# Patient Record
Sex: MALE | Race: WHITE | NOT HISPANIC OR LATINO | Employment: OTHER | URBAN - METROPOLITAN AREA
[De-identification: names, ages, dates, MRNs, and addresses within clinical notes are randomized per-mention and may not be internally consistent; named-entity substitution may affect disease eponyms.]

---

## 2017-07-17 ENCOUNTER — TRANSFERRED RECORDS (OUTPATIENT)
Dept: HEALTH INFORMATION MANAGEMENT | Facility: CLINIC | Age: 67
End: 2017-07-17

## 2017-10-12 ENCOUNTER — COMMUNICATION - HEALTHEAST (OUTPATIENT)
Dept: ADMINISTRATIVE | Facility: CLINIC | Age: 67
End: 2017-10-12

## 2017-10-29 ENCOUNTER — TRANSFERRED RECORDS (OUTPATIENT)
Dept: HEALTH INFORMATION MANAGEMENT | Facility: CLINIC | Age: 67
End: 2017-10-29

## 2017-10-29 ENCOUNTER — MEDICAL CORRESPONDENCE (OUTPATIENT)
Dept: HEALTH INFORMATION MANAGEMENT | Facility: CLINIC | Age: 67
End: 2017-10-29

## 2017-11-14 ENCOUNTER — RECORDS - HEALTHEAST (OUTPATIENT)
Dept: ADMINISTRATIVE | Facility: OTHER | Age: 67
End: 2017-11-14

## 2017-11-14 ENCOUNTER — AMBULATORY - HEALTHEAST (OUTPATIENT)
Dept: CARDIOLOGY | Facility: CLINIC | Age: 67
End: 2017-11-14

## 2017-11-16 ENCOUNTER — HOSPITAL ENCOUNTER (OUTPATIENT)
Dept: ULTRASOUND IMAGING | Facility: CLINIC | Age: 67
Discharge: HOME | End: 2017-11-16
Attending: INTERNAL MEDICINE

## 2017-11-16 ENCOUNTER — TRANSFERRED RECORDS (OUTPATIENT)
Dept: HEALTH INFORMATION MANAGEMENT | Facility: CLINIC | Age: 67
End: 2017-11-16

## 2017-11-16 ENCOUNTER — OFFICE VISIT - HEALTHEAST (OUTPATIENT)
Dept: CARDIOLOGY | Facility: CLINIC | Age: 67
End: 2017-11-16

## 2017-11-16 DIAGNOSIS — E78.00 HYPERCHOLESTEREMIA: ICD-10-CM

## 2017-11-16 DIAGNOSIS — I70.90 ATHEROSCLEROSIS: ICD-10-CM

## 2017-11-16 DIAGNOSIS — I25.10 ATHEROSCLEROSIS OF NATIVE CORONARY ARTERY OF NATIVE HEART WITHOUT ANGINA PECTORIS: ICD-10-CM

## 2017-11-16 DIAGNOSIS — R42 DIZZINESS: ICD-10-CM

## 2017-11-16 DIAGNOSIS — I10 ESSENTIAL HYPERTENSION: ICD-10-CM

## 2017-11-16 DIAGNOSIS — R09.89 OTHER SPECIFIED SYMPTOMS AND SIGNS INVOLVING THE CIRCULATORY AND RESPIRATORY SYSTEMS: ICD-10-CM

## 2017-11-16 RX ORDER — ALLOPURINOL 300 MG/1
300 TABLET ORAL DAILY
Status: SHIPPED | COMMUNITY
Start: 2016-12-07

## 2017-11-16 ASSESSMENT — MIFFLIN-ST. JEOR: SCORE: 1633.5

## 2017-11-27 ENCOUNTER — HOSPITAL ENCOUNTER (OUTPATIENT)
Dept: CARDIOLOGY | Facility: CLINIC | Age: 67
Discharge: HOME | End: 2017-11-27
Attending: INTERNAL MEDICINE

## 2017-11-27 DIAGNOSIS — I25.10 ATHEROSCLEROSIS OF NATIVE CORONARY ARTERY OF NATIVE HEART WITHOUT ANGINA PECTORIS: ICD-10-CM

## 2017-11-27 LAB
CV STRESS CURRENT BP HE: NORMAL
CV STRESS CURRENT HR HE: 101
CV STRESS CURRENT HR HE: 104
CV STRESS CURRENT HR HE: 105
CV STRESS CURRENT HR HE: 106
CV STRESS CURRENT HR HE: 111
CV STRESS CURRENT HR HE: 112
CV STRESS CURRENT HR HE: 116
CV STRESS CURRENT HR HE: 117
CV STRESS CURRENT HR HE: 117
CV STRESS CURRENT HR HE: 121
CV STRESS CURRENT HR HE: 122
CV STRESS CURRENT HR HE: 123
CV STRESS CURRENT HR HE: 127
CV STRESS CURRENT HR HE: 128
CV STRESS CURRENT HR HE: 136
CV STRESS CURRENT HR HE: 138
CV STRESS CURRENT HR HE: 138
CV STRESS CURRENT HR HE: 79
CV STRESS CURRENT HR HE: 83
CV STRESS CURRENT HR HE: 90
CV STRESS CURRENT HR HE: 92
CV STRESS CURRENT HR HE: 93
CV STRESS CURRENT HR HE: 93
CV STRESS CURRENT HR HE: 98
CV STRESS CURRENT HR HE: 99
CV STRESS DEVIATION TIME HE: NORMAL
CV STRESS ECHO PERCENT HR HE: NORMAL
CV STRESS EXERCISE STAGE HE: NORMAL
CV STRESS FINAL RESTING BP HE: NORMAL
CV STRESS FINAL RESTING HR HE: 99
CV STRESS MAX HR HE: 140
CV STRESS MAX TREADMILL GRADE HE: 16
CV STRESS MAX TREADMILL SPEED HE: 4.2
CV STRESS PEAK DIA BP HE: NORMAL
CV STRESS PEAK SYS BP HE: NORMAL
CV STRESS PHASE HE: NORMAL
CV STRESS PROTOCOL HE: NORMAL
CV STRESS RESTING PT POSITION HE: NORMAL
CV STRESS RESTING PT POSITION HE: NORMAL
CV STRESS ST DEVIATION AMOUNT HE: NORMAL
CV STRESS ST DEVIATION ELEVATION HE: NORMAL
CV STRESS ST EVELATION AMOUNT HE: NORMAL
CV STRESS TEST TYPE HE: NORMAL
CV STRESS TOTAL STAGE TIME MIN 1 HE: NORMAL
STRESS ECHO BASELINE BP: NORMAL
STRESS ECHO BASELINE HR: 81
STRESS ECHO CALCULATED PERCENT HR: 93 %
STRESS ECHO LAST STRESS BP: NORMAL
STRESS ECHO LAST STRESS HR: 138
STRESS ECHO POST ESTIMATED WORKLOAD: 11.9
STRESS ECHO POST EXERCISE DUR MIN: 10
STRESS ECHO POST EXERCISE DUR SEC: 10
STRESS ECHO TARGET HR: 128

## 2017-12-13 ENCOUNTER — TRANSFERRED RECORDS (OUTPATIENT)
Dept: HEALTH INFORMATION MANAGEMENT | Facility: CLINIC | Age: 67
End: 2017-12-13

## 2018-10-26 ENCOUNTER — TRANSFERRED RECORDS (OUTPATIENT)
Dept: HEALTH INFORMATION MANAGEMENT | Facility: CLINIC | Age: 68
End: 2018-10-26

## 2018-12-10 ENCOUNTER — TRANSFERRED RECORDS (OUTPATIENT)
Dept: HEALTH INFORMATION MANAGEMENT | Facility: CLINIC | Age: 68
End: 2018-12-10

## 2019-10-21 ENCOUNTER — AMBULATORY - HEALTHEAST (OUTPATIENT)
Dept: CARDIOLOGY | Facility: CLINIC | Age: 69
End: 2019-10-21

## 2019-10-21 ENCOUNTER — RECORDS - HEALTHEAST (OUTPATIENT)
Dept: ADMINISTRATIVE | Facility: OTHER | Age: 69
End: 2019-10-21

## 2019-10-28 ENCOUNTER — OFFICE VISIT - HEALTHEAST (OUTPATIENT)
Dept: CARDIOLOGY | Facility: CLINIC | Age: 69
End: 2019-10-28

## 2019-10-28 DIAGNOSIS — I25.119 ATHEROSCLEROSIS OF NATIVE CORONARY ARTERY OF NATIVE HEART WITH ANGINA PECTORIS (H): ICD-10-CM

## 2019-10-28 DIAGNOSIS — E78.00 HYPERCHOLESTEREMIA: ICD-10-CM

## 2019-10-28 DIAGNOSIS — I10 ESSENTIAL HYPERTENSION: ICD-10-CM

## 2019-10-28 DIAGNOSIS — I25.118 CORONARY ARTERY DISEASE OF NATIVE ARTERY OF NATIVE HEART WITH STABLE ANGINA PECTORIS (H): ICD-10-CM

## 2019-11-07 ENCOUNTER — HOSPITAL ENCOUNTER (OUTPATIENT)
Dept: CARDIOLOGY | Facility: CLINIC | Age: 69
Discharge: HOME | End: 2019-11-07
Attending: INTERNAL MEDICINE

## 2019-11-07 DIAGNOSIS — I25.118 CORONARY ARTERY DISEASE OF NATIVE ARTERY OF NATIVE HEART WITH STABLE ANGINA PECTORIS (H): ICD-10-CM

## 2019-11-07 LAB
CV STRESS CURRENT BP HE: NORMAL
CV STRESS CURRENT HR HE: 101
CV STRESS CURRENT HR HE: 106
CV STRESS CURRENT HR HE: 106
CV STRESS CURRENT HR HE: 109
CV STRESS CURRENT HR HE: 112
CV STRESS CURRENT HR HE: 118
CV STRESS CURRENT HR HE: 121
CV STRESS CURRENT HR HE: 127
CV STRESS CURRENT HR HE: 127
CV STRESS CURRENT HR HE: 128
CV STRESS CURRENT HR HE: 128
CV STRESS CURRENT HR HE: 69
CV STRESS CURRENT HR HE: 70
CV STRESS CURRENT HR HE: 77
CV STRESS CURRENT HR HE: 80
CV STRESS CURRENT HR HE: 81
CV STRESS CURRENT HR HE: 81
CV STRESS CURRENT HR HE: 83
CV STRESS CURRENT HR HE: 84
CV STRESS CURRENT HR HE: 85
CV STRESS CURRENT HR HE: 85
CV STRESS CURRENT HR HE: 88
CV STRESS CURRENT HR HE: 91
CV STRESS CURRENT HR HE: 92
CV STRESS CURRENT HR HE: 93
CV STRESS CURRENT HR HE: 95
CV STRESS CURRENT HR HE: 95
CV STRESS CURRENT HR HE: 99
CV STRESS DEVIATION TIME HE: NORMAL
CV STRESS ECHO PERCENT HR HE: NORMAL
CV STRESS EXERCISE STAGE HE: NORMAL
CV STRESS FINAL RESTING BP HE: NORMAL
CV STRESS FINAL RESTING HR HE: 81
CV STRESS MAX HR HE: 131
CV STRESS MAX TREADMILL GRADE HE: 16
CV STRESS MAX TREADMILL SPEED HE: 4.2
CV STRESS PEAK DIA BP HE: NORMAL
CV STRESS PEAK SYS BP HE: NORMAL
CV STRESS PHASE HE: NORMAL
CV STRESS PROTOCOL HE: NORMAL
CV STRESS RESTING PT POSITION HE: NORMAL
CV STRESS RESTING PT POSITION HE: NORMAL
CV STRESS ST DEVIATION AMOUNT HE: NORMAL
CV STRESS ST DEVIATION ELEVATION HE: NORMAL
CV STRESS ST EVELATION AMOUNT HE: NORMAL
CV STRESS TEST TYPE HE: NORMAL
CV STRESS TOTAL STAGE TIME MIN 1 HE: NORMAL
RATE PRESSURE PRODUCT: NORMAL
STRESS ECHO BASELINE DIASTOLIC HE: 64
STRESS ECHO BASELINE HR: 69
STRESS ECHO BASELINE SYSTOLIC BP: 114
STRESS ECHO CALCULATED PERCENT HR: 88 %
STRESS ECHO LAST STRESS DIASTOLIC BP: 62
STRESS ECHO LAST STRESS HR: 128
STRESS ECHO LAST STRESS SYSTOLIC BP: 158
STRESS ECHO POST ESTIMATED WORKLOAD: 11.1
STRESS ECHO POST EXERCISE DUR MIN: 9
STRESS ECHO POST EXERCISE DUR SEC: 33
STRESS ECHO TARGET HR: 149

## 2020-11-05 ENCOUNTER — RECORDS - HEALTHEAST (OUTPATIENT)
Dept: ADMINISTRATIVE | Facility: OTHER | Age: 70
End: 2020-11-05

## 2020-11-05 ENCOUNTER — AMBULATORY - HEALTHEAST (OUTPATIENT)
Dept: CARDIOLOGY | Facility: CLINIC | Age: 70
End: 2020-11-05

## 2020-11-05 ENCOUNTER — OFFICE VISIT - HEALTHEAST (OUTPATIENT)
Dept: CARDIOLOGY | Facility: CLINIC | Age: 70
End: 2020-11-05

## 2020-11-05 DIAGNOSIS — I70.90 ATHEROSCLEROSIS: ICD-10-CM

## 2020-11-05 LAB
CHOLEST SERPL-MCNC: 146 MG/DL
FASTING STATUS PATIENT QL REPORTED: NO
HDLC SERPL-MCNC: 41 MG/DL

## 2020-11-06 ENCOUNTER — COMMUNICATION - HEALTHEAST (OUTPATIENT)
Dept: CARDIOLOGY | Facility: CLINIC | Age: 70
End: 2020-11-06

## 2020-12-10 ENCOUNTER — TRANSFERRED RECORDS (OUTPATIENT)
Dept: HEALTH INFORMATION MANAGEMENT | Facility: CLINIC | Age: 70
End: 2020-12-10

## 2021-05-24 ENCOUNTER — RECORDS - HEALTHEAST (OUTPATIENT)
Dept: ADMINISTRATIVE | Facility: CLINIC | Age: 71
End: 2021-05-24

## 2021-05-25 ENCOUNTER — RECORDS - HEALTHEAST (OUTPATIENT)
Dept: ADMINISTRATIVE | Facility: CLINIC | Age: 71
End: 2021-05-25

## 2021-05-30 ENCOUNTER — RECORDS - HEALTHEAST (OUTPATIENT)
Dept: ADMINISTRATIVE | Facility: CLINIC | Age: 71
End: 2021-05-30

## 2021-05-31 ENCOUNTER — RECORDS - HEALTHEAST (OUTPATIENT)
Dept: ADMINISTRATIVE | Facility: CLINIC | Age: 71
End: 2021-05-31

## 2021-05-31 VITALS — WEIGHT: 191 LBS | HEIGHT: 71 IN | BODY MASS INDEX: 26.74 KG/M2

## 2021-06-03 VITALS
DIASTOLIC BLOOD PRESSURE: 78 MMHG | SYSTOLIC BLOOD PRESSURE: 119 MMHG | RESPIRATION RATE: 16 BRPM | BODY MASS INDEX: 26.72 KG/M2 | HEART RATE: 54 BPM | WEIGHT: 191.56 LBS | OXYGEN SATURATION: 97 %

## 2021-06-05 VITALS
DIASTOLIC BLOOD PRESSURE: 70 MMHG | RESPIRATION RATE: 16 BRPM | SYSTOLIC BLOOD PRESSURE: 110 MMHG | HEART RATE: 62 BPM | OXYGEN SATURATION: 100 % | WEIGHT: 189.5 LBS | BODY MASS INDEX: 26.43 KG/M2

## 2021-06-14 NOTE — PROGRESS NOTES
[facility]   Outpatient Follow-up evaluation.      Current Outpatient Prescriptions:      allopurinol (ZYLOPRIM) 300 MG tablet, Take by mouth., Disp: , Rfl:      aspirin-calcium carbonate 81 mg-300 mg calcium(777 mg) Tab, Take 81 mg by mouth., Disp: , Rfl:      atorvastatin (LIPITOR) 40 MG tablet, Take 40 mg by mouth bedtime., Disp: , Rfl:      lisinopril-hydrochlorothiazide (PRINZIDE,ZESTORETIC) 20-25 mg per tablet, Take 1 tablet by mouth daily., Disp: 90 tablet, Rfl: 0     metoprolol succinate (TOPROL-XL) 25 MG, Take 12.5 mg by mouth daily. 1/2 tablet daily, Disp: , Rfl:      nitroglycerin (NITROSTAT) 0.4 MG SL tablet, Place 0.4 mg under the tongue every 5 (five) minutes as needed for chest pain., Disp: , Rfl:      PARoxetine (PAXIL) 10 MG tablet, Take 10 mg by mouth every morning., Disp: , Rfl:      cephalexin (KEFLEX) 500 MG capsule, Take 500 mg by mouth 3 (three) times a day., Disp: , Rfl:      oxyCODONE-acetaminophen (PERCOCET) 5-325 mg per tablet, Take 1 tablet by mouth every 4 (four) hours as needed for pain., Disp: , Rfl:      PERCOCET 5-325 mg per tablet, Take 1-2 tablets by mouth every 4 (four) hours as needed for pain., Disp: 30 tablet, Rfl: 0        Bill Humphries is a 67 y.o. Male    Chief Complaint   Patient presents with     Follow-up       Diagnoses:  See Problem list     Recommendations:    Based on his risk profile symptoms would recommend a carotid ultrasound to assess dizziness lightheadedness is a symptomatic manifestation of atherosclerotic carotid artery disease.  In view of his past history of myocardial infarction without any premonitory symptoms but again obtain a exercise stress test to screen for ischemic heart disease.  As he may have a relative silent cardiac ischemic condition.  Continue with atorvastatin management of hyperlipidemia which she is currently in control.  Blood pressure is well controlled as well.  In view of lightheadedness and dizziness would suggest  withdrawal of metoprolol therapy and reassess.  If he begins to have palpitations again which is the reason he had initiated therapy we can consider resumption.      Subjective:   No active angina at this time breathing is stable.  Exercises regularly.  Had recent URI that was somewhat prolonged which she is currently recovering.  Tolerating his medication well but is noticing more frequent episodes of lightheadedness and dizzy episodes.  When he had his acute myocardial infarction he had no previous symptoms beforehand his for symptomatic manifestation was of a tightness of the chest and occurred the day of the infarction.                    Past Medical History:   Diagnosis Date     Coronary artery disease     x3 stents     Hyperlipidemia      Hypertension      Myocardial infarction 2009     Past Surgical History:   Procedure Laterality Date     APPENDECTOMY       CARDIAC CATHETERIZATION       PENILE PROSTHESIS IMPLANT       PENILE PROSTHESIS REVISION N/A 6/30/2015    Procedure: REMOVE PENILE PROSTHESIS;  Surgeon: John Snyder MD;  Location: [facility] [place];  Service:      MO REMOVAL OF TONSILS,<11 Y/O      Description: Tonsillectomy;  Recorded: 01/03/2008;  Annotations: 1958     prostratectomy       Allergies   Allergen Reactions     Omniscan [Gadodiamide] Hives     No family history on file.   Social History     Social History     Marital status:      Spouse name: N/A     Number of children: N/A     Years of education: N/A     Occupational History     Not on file.     Social History Main Topics     Smoking status: Never Smoker     Smokeless tobacco: Not on file     Alcohol use Yes     Drug use: No     Sexual activity: Not on file     Other Topics Concern     Not on file     Social History Narrative     Family history not pertinent to chief complaint or presenting problem    Current Outpatient Prescriptions:      allopurinol (ZYLOPRIM) 300 MG tablet, Take by mouth., Disp: , Rfl:       "aspirin-calcium carbonate 81 mg-300 mg calcium(777 mg) Tab, Take 81 mg by mouth., Disp: , Rfl:      atorvastatin (LIPITOR) 40 MG tablet, Take 40 mg by mouth bedtime., Disp: , Rfl:      lisinopril-hydrochlorothiazide (PRINZIDE,ZESTORETIC) 20-25 mg per tablet, Take 1 tablet by mouth daily., Disp: 90 tablet, Rfl: 0     metoprolol succinate (TOPROL-XL) 25 MG, Take 12.5 mg by mouth daily. 1/2 tablet daily, Disp: , Rfl:      nitroglycerin (NITROSTAT) 0.4 MG SL tablet, Place 0.4 mg under the tongue every 5 (five) minutes as needed for chest pain., Disp: , Rfl:      PARoxetine (PAXIL) 10 MG tablet, Take 10 mg by mouth every morning., Disp: , Rfl:      cephalexin (KEFLEX) 500 MG capsule, Take 500 mg by mouth 3 (three) times a day., Disp: , Rfl:      oxyCODONE-acetaminophen (PERCOCET) 5-325 mg per tablet, Take 1 tablet by mouth every 4 (four) hours as needed for pain., Disp: , Rfl:      PERCOCET 5-325 mg per tablet, Take 1-2 tablets by mouth every 4 (four) hours as needed for pain., Disp: 30 tablet, Rfl: 0      Objective:   /66 (Patient Site: Left Arm, Patient Position: Sitting, Cuff Size: Adult Large)  Pulse 67  Resp 16  Ht 5' 11\" (1.803 m)  Wt 191 lb (86.6 kg) Comment: With shoes  BMI 26.64 kg/m2  191 lb (86.6 kg)   Wt Readings from Last 3 Encounters:   11/16/17 191 lb (86.6 kg)   06/30/15 185 lb (83.9 kg)     BP Readings from Last 3 Encounters:   11/16/17 102/66   06/30/15 115/62     Pulse Readings from Last 3 Encounters:   11/16/17 67   06/30/15 70     General appearance: alert, appears stated age and cooperative  Head: Normocephalic, without obvious abnormality, atraumatic  Eyes: Normal external exam without jaundice.  Ears: Normal external auricular exam.  Nose: Normal external exam.  Lungs: clear to auscultation bilaterally  Chest wall: no tenderness  Heart: regular rate and rhythm, S1, S2 normal, no murmur, click, rub or gallop normal  Pulses: 2+ and symmetric  Skin: Skin color, texture, turgor normal. "   Neurologic: Grossly normal, no focal neurologic findings.    Review of Systems:   General: Night Sweats  Cardiographics: Reviewed in clinic.    Lab Results:  Lab Results: Personally reviewed  Lab Results   Component Value Date    CHOL 165 11/07/2012    TRIG 249 (H) 11/07/2012    HDL 45 11/07/2012       Clinical evaluation time today including exam 20 minutes.  At least 50% of clinic evaluation time involved in assessment and patient counseling.  Part of this chart was created using a dictation software.  Typographic errors, word substitutions, and grammatical errors may unintentionally occur.    Errol Reyes M.D.  [facility]

## 2021-06-16 PROBLEM — R42 DIZZINESS: Status: ACTIVE | Noted: 2017-11-16

## 2021-06-16 PROBLEM — I70.90 ATHEROSCLEROSIS: Status: ACTIVE | Noted: 2017-11-16

## 2021-06-21 NOTE — LETTER
Letter by Errol Reyes MD at      Author: Errol Reyes MD Service: -- Author Type: --    Filed:  Encounter Date: 11/6/2020 Status: (Other)         Bill Humphries  [address] [place] [address]             November 6, 2020         Dear Mr. Humphries,    Below are the results from your recent visit:    Resulted Orders   HDL cholesterol   Result Value Ref Range    HDL Cholesterol 41 >=40 mg/dL    Patient Fasting > 8hrs? No    Cholesterol, total   Result Value Ref Range    Cholesterol 146 <=199 mg/dL     The test results show that your current treatment is working. Please continue your current medication and plan. We recommend that you follow-up in clinic in 3 years.     Please call with questions or contact us using [employer].    Sincerely,        Electronically signed by Errol Reyes MD

## 2021-06-28 NOTE — PROGRESS NOTES
Progress Notes by Errol Reyes MD at 10/28/2019  8:30 AM     Author: Errol Reyes MD Service: -- Author Type: Physician    Filed: 10/28/2019  9:12 AM Encounter Date: 10/28/2019 Status: Signed    : Errol Reyes MD (Physician)           Thank you, Dr. Victor, for asking the [facility] team to see Mr. Bill Humphries to evaluate chest discomfort.      Assessment/Recommendations   Assessment:    1. CAD Inferior MI 2007.  2. Essential HTN  3. Hypercholesterolemia LD 70  4. Chest pain. Sx does not seem typical for angina, but given hx would pursue assessment for CAD and ischmeia as cause.    Plan:  1. Stress echo.  2. Continue ACE/HTCZ and metoprolol for HTN.  3. Continue atorvastatin for HLD       History of Present Illness/Subjective    Mr. Bill Humphries is a 69 y.o. male with hx distant CAD in 2007 with MI but without recurrence.  Notes upeer  Mid back ache with chest ache daily over past year.  Lasts sevral hours and nonexertional.  No NTG use.      ECG: 10/26/2018 Personally reviewed. normal EKG, normal sinus rhythm, unchanged from previous tracings.    ECHOCARDIOGRAM: 2017 with stress test.    1. Normal stress echocardiogram without evidence of stress induced ischemia.   2. Normal resting LV systolic performance with an ejection fraction of 55-60%. There is normal improvement in left ventricular systolic performance with a peak ejection fraction of 70-75%.  3. No ECG evidence of ischemia.  4. No anginal chest pain reported with exercise.  5. Good functional capacity for age.    NXT: 2014 Exercise stress nuclear study is negative for inducible myocardial ischemia or infarction.  Carotid ULS 2017:    1.  Mild atheromatous plaque in the carotid arteries.  2.  No significant stenosis on either side.     Physical Examination Review of Systems   Vitals:    10/28/19 0838   BP: 119/78   Pulse: (!) 54   Resp: 16   SpO2: 97%     Body mass index is 26.72 kg/m .  Wt Readings from Last 5  Encounters:   10/28/19 191 lb 9 oz (86.9 kg)   12/16/17 182 lb (82.6 kg)   11/16/17 191 lb (86.6 kg)   06/30/15 185 lb (83.9 kg)     BP Readings from Last 5 Encounters:   10/28/19 119/78   12/16/17 117/78   11/16/17 102/66   06/30/15 115/62     Pulse Readings from Last 5 Encounters:   10/28/19 (!) 54   12/16/17 87   11/16/17 67   06/30/15 70       General:   alert, appears stated age and cooperative  normocephalic, without obvious abnormality   ENT/Mouth: membranes moist, no oral lesions or bleeding gums.      EYES:  no scleral icterus, normal conjunctivae   Neck: no carotid bruits or thyromegaly   Chest/Lungs:   lungs are clear to auscultation, no rales or wheezing,    Cardiovascular:   Regular. Normal first and second heart sounds with no murmurs, rubs, or gallops; No edema bilaterally    Abdomen:  Normal bowel tones   Extremities: no cyanosis or clubbing, pulses intact   Skin: Color, texture normal.    Neurologic: No focal neurologic deficits          General: Night Sweats  Eyes: WNL  Ears/Nose/Throat: WNL     Heart: (palpitations)  Stomach: WNL  Bladder: Frequent Urination at Night  Muscle/Joints: Joint Pain  Skin: WNL  Nervous System: WNL  Mental Health: WNL     Blood: WNL     Medical History  Surgical History Family History Social History   Past Medical History:   Diagnosis Date   ? Coronary artery disease     x3 stents   ? Hyperlipidemia    ? Hypertension    ? Myocardial infarction (H) 2009    Past Surgical History:   Procedure Laterality Date   ? APPENDECTOMY     ? CARDIAC CATHETERIZATION     ? CORONARY STENT PLACEMENT      3 stents   ? JOINT REPLACEMENT Right     Right great toe   ? PENILE PROSTHESIS IMPLANT     ? PENILE PROSTHESIS REVISION N/A 6/30/2015    Procedure: REMOVE PENILE PROSTHESIS;  Surgeon: John Snyder MD;  Location: [facility] OR;  Service:    ? CT REMOVAL OF TONSILS,<11 Y/O      Description: Tonsillectomy;  Recorded: 01/03/2008;  Annotations: 1958   ? prostratectomy      No family  history on file. Social History     Socioeconomic History   ? Marital status:      Spouse name: Not on file   ? Number of children: Not on file   ? Years of education: Not on file   ? Highest education level: Not on file   Occupational History   ? Not on file   Social Needs   ? Financial resource strain: Not on file   ? Food insecurity:     Worry: Not on file     Inability: Not on file   ? Transportation needs:     Medical: Not on file     Non-medical: Not on file   Tobacco Use   ? Smoking status: Never Smoker   ? Smokeless tobacco: Never Used   ? Tobacco comment: smoked pipe but hasn't for 15 years   Substance and Sexual Activity   ? Alcohol use: Yes   ? Drug use: No   ? Sexual activity: Not on file   Lifestyle   ? Physical activity:     Days per week: Not on file     Minutes per session: Not on file   ? Stress: Not on file   Relationships   ? Social connections:     Talks on phone: Not on file     Gets together: Not on file     Attends Baptism service: Not on file     Active member of club or organization: Not on file     Attends meetings of clubs or organizations: Not on file     Relationship status: Not on file   ? Intimate partner violence:     Fear of current or ex partner: Not on file     Emotionally abused: Not on file     Physically abused: Not on file     Forced sexual activity: Not on file   Other Topics Concern   ? Not on file   Social History Narrative   ? Not on file          Medications  Allergies   Current Outpatient Medications   Medication Sig Dispense Refill   ? aspirin-calcium carbonate 81 mg-300 mg calcium(777 mg) Tab Take 81 mg by mouth.     ? atorvastatin (LIPITOR) 40 MG tablet Take 40 mg by mouth bedtime.     ? lisinopril-hydrochlorothiazide (PRINZIDE,ZESTORETIC) 20-25 mg per tablet Take 1 tablet by mouth daily. 90 tablet 0   ? nitroglycerin (NITROSTAT) 0.4 MG SL tablet Place 0.4 mg under the tongue every 5 (five) minutes as needed for chest pain.     ? PARoxetine (PAXIL) 10 MG  tablet Take 10 mg by mouth every morning.     ? allopurinol (ZYLOPRIM) 300 MG tablet Take by mouth.     ? HYDROcodone-acetaminophen 5-325 mg per tablet Take 1 tablet by mouth every 6 (six) hours as needed for pain. 16 tablet 0   ? metoprolol succinate (TOPROL-XL) 25 MG Take 12.5 mg by mouth daily. 1/2 tablet daily     ? oxyCODONE-acetaminophen (PERCOCET) 5-325 mg per tablet Take 1 tablet by mouth every 4 (four) hours as needed for pain.     ? PERCOCET 5-325 mg per tablet Take 1-2 tablets by mouth every 4 (four) hours as needed for pain. 30 tablet 0     No current facility-administered medications for this visit.     Allergies   Allergen Reactions   ? Duloxetine Nausea And Vomiting and Other (See Comments)   ? Omniscan [Gadodiamide] Hives   ? Tessalon [Benzonatate] Dizziness   ? Iodinated Contrast Media Rash     GIVEN OMNIPAQUE 350 NO REACTION POST INJECTION.   Other reaction(s): Cutaneous eruption (morphologic abnormality)         Lab Results    Chemistry/lipid CBC Cardiac Enzymes/BNP/TSH/INR   Lab Results   Component Value Date    CHOL 165 11/07/2012    HDL 45 11/07/2012    LDLCALC 71 11/07/2012    TRIG 249 (H) 11/07/2012    No results found for: WBC, HGB, HCT, MCV, PLT No results found for: CKTOTAL, CKMB, CKMBINDEX, TROPONINI, BNP, TSH, INR      Clinical evaluation time today including exam 20 minutes.  At least 50% of clinic evaluation time involved in assessment and patient counseling.  Part of this chart was created using a dictation software.  Typographic errors, word substitutions, and grammatical errors may unintentionally occur.    Errol Reyes M.D.  [facility]

## 2021-06-29 NOTE — PROGRESS NOTES
Progress Notes by Errol Reyes MD at 11/5/2020  1:10 PM     Author: Errol Reyes MD Service: -- Author Type: Physician    Filed: 11/5/2020  1:40 PM Encounter Date: 11/5/2020 Status: Signed    : Errol Reyes MD (Physician)         Cardiology Progress Note        Assessment:  1. CAD Inferior MI 2007.  2. Essential HTN  3. Hypercholesterolemia LDL 70  4. Chest pain  5. Normal stress echo 2019  6. Carotid ULS minimal ASVD      Recommendations:  Check cholesterol values today.  Continue with current medical therapy.  Update us with any changes or new symptoms.    Subjective:  Interval History: Patient here for routine follow-up.  Not having any chest pain pressure tightness or heaviness.  Regular activity tolerated well.  Current Outpatient Medications   Medication Sig Dispense Refill   ? allopurinol (ZYLOPRIM) 300 MG tablet Take by mouth.     ? aspirin-calcium carbonate 81 mg-300 mg calcium(777 mg) Tab Take 81 mg by mouth.     ? atorvastatin (LIPITOR) 40 MG tablet Take 40 mg by mouth bedtime.     ? HYDROcodone-acetaminophen 5-325 mg per tablet Take 1 tablet by mouth every 6 (six) hours as needed for pain. 16 tablet 0   ? lisinopril-hydrochlorothiazide (PRINZIDE,ZESTORETIC) 20-25 mg per tablet Take 1 tablet by mouth daily. 90 tablet 0   ? metoprolol succinate (TOPROL-XL) 25 MG Take 12.5 mg by mouth daily. 1/2 tablet daily     ? nitroglycerin (NITROSTAT) 0.4 MG SL tablet Place 0.4 mg under the tongue every 5 (five) minutes as needed for chest pain.     ? oxyCODONE-acetaminophen (PERCOCET) 5-325 mg per tablet Take 1 tablet by mouth every 4 (four) hours as needed for pain.     ? PARoxetine (PAXIL) 10 MG tablet Take 10 mg by mouth every morning.     ? PERCOCET 5-325 mg per tablet Take 1-2 tablets by mouth every 4 (four) hours as needed for pain. 30 tablet 0     No current facility-administered medications for this visit.          Objective:   Vital signs in last 24 hours:  [unfilled]  Weight:          Review of Systems:  Pertinent items are noted in HPI.  A 12 point comprehensive review of systems was negative except as noted.   Family history not pertinent to chief complaint or presenting problem  Physical Exam:  There were no vitals filed for this visit.  Head and neck without focal cranial neurologic defects.  JVD not distended.  Carotid upstroke normal without bruit.  External eye exam normal without icterus.  External ear exam normal.  Neck without cervical lymphadenopathy or thyromegaly.  /70 (Patient Site: Left Arm, Patient Position: Sitting, Cuff Size: Adult Regular)   Pulse 62   Resp 16   Wt 189 lb 8 oz (86 kg)   SpO2 100%   BMI 26.43 kg/m    General appearance: alert, appears stated age and cooperative  Lungs: clear to auscultation bilaterally  Heart: regular rate and rhythm, S1, S2 normal, no murmur, click, rub or gallop   Abdomen with normal bowel tones.  Skin without rash, ecchymosis, lesions.  Neuromuscular tone normal.  Peripheral pulse intact and equal.  Joints without swelling or erythema.    Wt Readings from Last 3 Encounters:   10/28/19 191 lb 9 oz (86.9 kg)   12/16/17 182 lb (82.6 kg)   11/16/17 191 lb (86.6 kg)     Temp Readings from Last 3 Encounters:   12/16/17 98.6  F (37  C) (Oral)   06/30/15 98.6  F (37  C) (Temporal)     BP Readings from Last 3 Encounters:   10/28/19 119/78   12/16/17 117/78   11/16/17 102/66     Pulse Readings from Last 3 Encounters:   10/28/19 (!) 54   12/16/17 87   11/16/17 67        @LASTSAO2(3)@  @[facility]@    Cardiographics:     ECG: All ECGs were personally reviewed and noted.no prior ECG  Echocardiogram: findings as noted    Lab Results:   Lab results personally reviewed.    Lab Results   Component Value Date    CHOL 165 11/07/2012    TRIG 249 (H) 11/07/2012    HDL 45 11/07/2012     INR/Prothrombin Time: No results found for: INR, PROTIME    Current Outpatient Medications:   ?  allopurinol (ZYLOPRIM) 300 MG tablet, Take by mouth., Disp: ,  Rfl:   ?  aspirin-calcium carbonate 81 mg-300 mg calcium(777 mg) Tab, Take 81 mg by mouth., Disp: , Rfl:   ?  atorvastatin (LIPITOR) 40 MG tablet, Take 40 mg by mouth bedtime., Disp: , Rfl:   ?  HYDROcodone-acetaminophen 5-325 mg per tablet, Take 1 tablet by mouth every 6 (six) hours as needed for pain., Disp: 16 tablet, Rfl: 0  ?  lisinopril-hydrochlorothiazide (PRINZIDE,ZESTORETIC) 20-25 mg per tablet, Take 1 tablet by mouth daily., Disp: 90 tablet, Rfl: 0  ?  metoprolol succinate (TOPROL-XL) 25 MG, Take 12.5 mg by mouth daily. 1/2 tablet daily, Disp: , Rfl:   ?  nitroglycerin (NITROSTAT) 0.4 MG SL tablet, Place 0.4 mg under the tongue every 5 (five) minutes as needed for chest pain., Disp: , Rfl:   ?  oxyCODONE-acetaminophen (PERCOCET) 5-325 mg per tablet, Take 1 tablet by mouth every 4 (four) hours as needed for pain., Disp: , Rfl:   ?  PARoxetine (PAXIL) 10 MG tablet, Take 10 mg by mouth every morning., Disp: , Rfl:   ?  PERCOCET 5-325 mg per tablet, Take 1-2 tablets by mouth every 4 (four) hours as needed for pain., Disp: 30 tablet, Rfl: 0  Time spent in clinical evaluation including counseling was 20 minutes.  Part of this chart was created using a dictation software.  Typographic errors, word substitutions, and Grammatical errors may unintentionally occur.

## 2021-08-04 ENCOUNTER — TRANSFERRED RECORDS (OUTPATIENT)
Dept: HEALTH INFORMATION MANAGEMENT | Facility: CLINIC | Age: 71
End: 2021-08-04

## 2021-12-03 ENCOUNTER — TRANSFERRED RECORDS (OUTPATIENT)
Dept: HEALTH INFORMATION MANAGEMENT | Facility: CLINIC | Age: 71
End: 2021-12-03

## 2022-06-20 LAB
ALT SERPL-CCNC: 32 U/L
AST SERPL-CCNC: 36 U/L (ref 17–59)
CHOLESTEROL (EXTERNAL): 135 MG/DL (ref 0–200)
CREATININE (EXTERNAL): 0.8 MG/DL (ref 0.7–1.4)
GLUCOSE (EXTERNAL): 91 MG/DL (ref 60–99)
HBA1C MFR BLD: 5.4 %
HDLC SERPL-MCNC: 45 MG/DL (ref 35–65)
LDL CHOLESTEROL CALCULATED (EXTERNAL): 58 MG/DL (ref 0–130)
POTASSIUM (EXTERNAL): 4.1 MMOL/L (ref 3.5–5.1)
TRIGLYCERIDES (EXTERNAL): 159 MG/DL (ref 0–200)
TSH SERPL-ACNC: 1.95 MIU/L (ref 0.4–4.5)

## 2022-09-13 ENCOUNTER — TRANSFERRED RECORDS (OUTPATIENT)
Dept: HEALTH INFORMATION MANAGEMENT | Facility: CLINIC | Age: 72
End: 2022-09-13

## 2022-09-15 ENCOUNTER — TRANSFERRED RECORDS (OUTPATIENT)
Dept: HEALTH INFORMATION MANAGEMENT | Facility: CLINIC | Age: 72
End: 2022-09-15

## 2022-09-20 ENCOUNTER — TRANSFERRED RECORDS (OUTPATIENT)
Dept: HEALTH INFORMATION MANAGEMENT | Facility: CLINIC | Age: 72
End: 2022-09-20

## 2022-10-04 ENCOUNTER — TRANSFERRED RECORDS (OUTPATIENT)
Dept: HEALTH INFORMATION MANAGEMENT | Facility: CLINIC | Age: 72
End: 2022-10-04

## 2022-10-25 ENCOUNTER — TRANSFERRED RECORDS (OUTPATIENT)
Dept: HEALTH INFORMATION MANAGEMENT | Facility: CLINIC | Age: 72
End: 2022-10-25

## 2023-07-01 ENCOUNTER — TRANSFERRED RECORDS (OUTPATIENT)
Dept: HEALTH INFORMATION MANAGEMENT | Facility: CLINIC | Age: 73
End: 2023-07-01
Payer: MEDICARE

## 2023-07-01 ENCOUNTER — MEDICAL CORRESPONDENCE (OUTPATIENT)
Dept: HEALTH INFORMATION MANAGEMENT | Facility: CLINIC | Age: 73
End: 2023-07-01
Payer: MEDICARE

## 2023-07-01 LAB
ALT SERPL-CCNC: 46 U/L
AST SERPL-CCNC: 41 U/L (ref 17–59)
CHOLESTEROL (EXTERNAL): 153 MG/DL (ref 0–200)
CREATININE (EXTERNAL): 0.9 MG/DL (ref 0.7–1.4)
GLUCOSE (EXTERNAL): 127 MG/DL (ref 60–99)
HDLC SERPL-MCNC: 47 MG/DL (ref 35–65)
LDL CHOLESTEROL CALCULATED (EXTERNAL): 73 MG/DL (ref 0–130)
POTASSIUM (EXTERNAL): 4.1 MMOL/L (ref 3.5–5.1)
TRIGLYCERIDES (EXTERNAL): 169 MG/DL (ref 0–200)

## 2023-07-10 ENCOUNTER — TRANSFERRED RECORDS (OUTPATIENT)
Dept: HEALTH INFORMATION MANAGEMENT | Facility: CLINIC | Age: 73
End: 2023-07-10

## 2023-07-10 LAB — HBA1C MFR BLD: 5.13 % (ref 4–5.6)

## 2023-07-11 ENCOUNTER — TRANSFERRED RECORDS (OUTPATIENT)
Dept: HEALTH INFORMATION MANAGEMENT | Facility: CLINIC | Age: 73
End: 2023-07-11

## 2023-07-18 ENCOUNTER — OFFICE VISIT (OUTPATIENT)
Dept: CARDIOLOGY | Facility: CLINIC | Age: 73
End: 2023-07-18
Payer: MEDICARE

## 2023-07-18 VITALS
SYSTOLIC BLOOD PRESSURE: 112 MMHG | HEART RATE: 76 BPM | BODY MASS INDEX: 25.79 KG/M2 | DIASTOLIC BLOOD PRESSURE: 60 MMHG | RESPIRATION RATE: 16 BRPM | HEIGHT: 72 IN | WEIGHT: 190.4 LBS

## 2023-07-18 DIAGNOSIS — I25.10 CORONARY ARTERY DISEASE INVOLVING NATIVE CORONARY ARTERY OF NATIVE HEART WITHOUT ANGINA PECTORIS: Primary | ICD-10-CM

## 2023-07-18 DIAGNOSIS — I10 ESSENTIAL HYPERTENSION: ICD-10-CM

## 2023-07-18 PROCEDURE — 99214 OFFICE O/P EST MOD 30 MIN: CPT | Performed by: INTERNAL MEDICINE

## 2023-07-18 RX ORDER — ASPIRIN 81 MG/1
81 TABLET ORAL DAILY
COMMUNITY

## 2023-07-18 RX ORDER — ATORVASTATIN CALCIUM 80 MG/1
80 TABLET, FILM COATED ORAL AT BEDTIME
Qty: 90 TABLET | Refills: 3 | Status: SHIPPED | OUTPATIENT
Start: 2023-07-18 | End: 2023-10-02 | Stop reason: DRUGHIGH

## 2023-07-18 RX ORDER — INFLIXIMAB 100 MG/10ML
INJECTION, POWDER, LYOPHILIZED, FOR SOLUTION INTRAVENOUS
COMMUNITY
Start: 2023-01-10

## 2023-07-18 NOTE — LETTER
7/18/2023    CRAIG DUKE MD  [facility] [address]  [facility] [address]    RE: Bill Humphries       Dear Colleague,     I had the pleasure of seeing Bill Humphries in the [facility].      [facility]  [phone]          Assessment/Recommendations   Patient with known coronary artery disease with myocardial in the inferior wall in 2007.  He had a stent placed at that time and 2 stents shortly thereafter.  He has not had any further percutaneous interventions and unremarkable stress test.  He is feeling well.  He walks 2 or 3 times a week and golfs 1 or 2 2 times each week.  He is having no chest discomfort or shortness of breath.    His LDL cholesterol is just above goal at 73 and with recent considerations to drive the LDL is down to 50, will increase his our atorvastatin to 80 mg a day.  This will certainly bring his LDL cholesterol below 70.    I encouraged him to do a bit more swimming and to exercise for a minimum of 30 minutes and at least 5 times each week.  Swimming is excellent exercise and will spare his back from discomfort as well.  He does swim in [place] so he can start doing that here in [place].    I have asked him to call us if he has any changes in his functional capacity or new shortness of breath or chest discomfort.  If he remains stable we will see him back in 1 year, but of course would be happy to see him sooner if questions or problems arise.    Thank you for allowing us to precipitate in his care.     History of Present Illness/Subjective    Mr. Bill Humphries is a 73 year old male with known coronary artery disease who is followed by Dr. Errol Hernandez for many years.  He is been feeling well.  He has not had any chest discomfort, unusual shortness of breath, orthopnea, paroxysmal nocturnal dyspnea, peripheral edema, syncope but does get a little lightheaded sometimes when he changes positions quickly.  His LDL cholesterol was recently  measured at 73.  He does take medications for blood pressure her blood pressures have been well controlled.    He grew up in [place].  He is retired dental technician.  He is  and his spouse is with him at the time of our evaluation.            Physical Examination Review of Systems   /60 (BP Location: Left arm, Patient Position: Sitting, Cuff Size: Adult Regular)   Pulse 76   Resp 16   Ht 1.829 m (6')   Wt 86.4 kg (190 lb 6.4 oz)   BMI 25.82 kg/m    Body mass index is 25.82 kg/m .  Wt Readings from Last 3 Encounters:   07/18/23 86.4 kg (190 lb 6.4 oz)   11/05/20 86 kg (189 lb 8 oz)   10/28/19 86.9 kg (191 lb 9 oz)     General Appearance:   Alert, cooperative and in no acute distress.   ENT/Mouth: Pink/moist oral mucosa   EYES:  no scleral icterus, normal conjunctivae   Neck: JVP normal. No Hepatojugular reflux. Thyroid not visualized.   Chest/Lungs:   Lungs are clear to auscultation, equal chest wall expansion.   Cardiovascular:   S1, S2 without murmur ,clicks or rubs. Brachial, radial and posterior tibial pulses are intact and symetric. No carotid bruits noted   Abdomen:  Nontender. BS+.    Extremities: No cyanosis, clubbing or edema   Skin: no xanthelasma, warm.    Neurologic: normal arm movement bilateral, no tremors     Psychiatric: Appropriate affect.      Enc Vitals  BP: 112/60  Pulse: 76  Resp: 16  Weight: 86.4 kg (190 lb 6.4 oz)  Height: 182.9 cm (6')                                           Medical History  Surgical History Family History Social History   Past Medical History:   Diagnosis Date    Coronary artery disease     x3 stents    Hyperlipidemia     Hypertension     Myocardial infarction (H) 2009    Past Surgical History:   Procedure Laterality Date    APPENDECTOMY      CARDIAC CATHETERIZATION      CORONARY STENT PLACEMENT      3 stents    HC REMOVAL OF TONSILS,<11 Y/O      Description: Tonsillectomy;  Recorded: 01/03/2008;  Annotations: 1960    IMPLANT PROSTHESIS PENIS  INFLATABLE      IR MISCELLANEOUS PROCEDURE  5/5/2008    IR MISCELLANEOUS PROCEDURE  11/20/2008    JOINT REPLACEMENT Right     Right great toe    OTHER SURGICAL HISTORY      prostratectomy    PENILE PROSTHESIS REVISION N/A 6/30/2015    Procedure: REMOVE PENILE PROSTHESIS;  Surgeon: John Snyder MD;  Location: [facility] [place] [facility] Main OR;  Service:     No family history on file. Social History     Socioeconomic History    Marital status:      Spouse name: Not on file    Number of children: Not on file    Years of education: Not on file    Highest education level: Not on file   Occupational History    Not on file   Tobacco Use    Smoking status: Former     Types: Pipe    Smokeless tobacco: Never    Tobacco comments:     smoked pipe but hasn't for 15 years   Vaping Use    Vaping Use: Never used   Substance and Sexual Activity    Alcohol use: Yes    Drug use: Never    Sexual activity: Not on file   Other Topics Concern    Not on file   Social History Narrative    Not on file     Social Determinants of Health     Financial Resource Strain: Not on file   Food Insecurity: Not on file   Transportation Needs: Not on file   Physical Activity: Not on file   Stress: Not on file   Social Connections: Not on file   Intimate Partner Violence: Not on file   Housing Stability: Not on file          Medications  Allergies   Current Outpatient Medications   Medication Sig Dispense Refill    allopurinol (ZYLOPRIM) 300 MG tablet Take 300 mg by mouth daily      aspirin 81 MG EC tablet Take 81 mg by mouth daily      atorvastatin (LIPITOR) 80 MG tablet Take 1 tablet (80 mg) by mouth At Bedtime 90 tablet 3    inFLIXimab (REMICADE) 100 MG injection Administer REMICADE 5mg/kg IV every 6 weeks for 12 infusions      lisinopril-hydrochlorothiazide (PRINZIDE,ZESTORETIC) 20-25 mg per tablet [LISINOPRIL-HYDROCHLOROTHIAZIDE (PRINZIDE,ZESTORETIC) 20-25 MG PER TABLET] Take 1 tablet by mouth daily. 90 tablet 0    metoprolol succinate (TOPROL-XL) 25 MG  [METOPROLOL SUCCINATE (TOPROL-XL) 25 MG] Take 12.5 mg by mouth daily. 1/2 tablet daily      nitroglycerin (NITROSTAT) 0.4 MG SL tablet [NITROGLYCERIN (NITROSTAT) 0.4 MG SL TABLET] Place 0.4 mg under the tongue every 5 (five) minutes as needed for chest pain.      omeprazole (PRILOSEC) 20 MG DR capsule Take 20 mg by mouth daily      PARoxetine (PAXIL) 10 MG tablet [PAROXETINE (PAXIL) 10 MG TABLET] Take 10 mg by mouth every morning.      Allergies   Allergen Reactions    Duloxetine Nausea and Vomiting and Other (See Comments)    Omniscan [Gadodiamide] Hives    Tessalon [Benzonatate] Dizziness    Iodinated Contrast Media [Iodinated Contrast Media] Rash     GIVEN OMNIPAQUE 350 NO REACTION POST INJECTION. , Other reaction(s): Cutaneous eruption (morphologic abnormality)         Lab Results    Chemistry/lipid CBC Cardiac Enzymes/BNP/TSH/INR   Lab Results   Component Value Date    CHOL 146 11/05/2020    HDL 41 11/05/2020    TRIG 249 (H) 11/07/2012    No results found for: WBC, HGB, HCT, MCV, PLT No results found for: CKTOTAL, CKMB, TROPONINI, BNP, TSH, INR                                            Thank you for allowing me to participate in the care of your patient.      Sincerely,     Ar Wang MD     [facility]  cc:   No referring provider defined for this encounter.

## 2023-07-18 NOTE — PROGRESS NOTES
[facility]  [phone]          Assessment/Recommendations   Patient with known coronary artery disease with myocardial in the inferior wall in 2007.  He had a stent placed at that time and 2 stents shortly thereafter.  He has not had any further percutaneous interventions and unremarkable stress test.  He is feeling well.  He walks 2 or 3 times a week and golfs 1 or 2 2 times each week.  He is having no chest discomfort or shortness of breath.    His LDL cholesterol is just above goal at 73 and with recent considerations to drive the LDL is down to 50, will increase his our atorvastatin to 80 mg a day.  This will certainly bring his LDL cholesterol below 70.    I encouraged him to do a bit more swimming and to exercise for a minimum of 30 minutes and at least 5 times each week.  Swimming is excellent exercise and will spare his back from discomfort as well.  He does swim in [place] so he can start doing that here in [place].    I have asked him to call us if he has any changes in his functional capacity or new shortness of breath or chest discomfort.  If he remains stable we will see him back in 1 year, but of course would be happy to see him sooner if questions or problems arise.    Thank you for allowing us to precipitate in his care.     History of Present Illness/Subjective    Mr. [place] is a 73 year old male with known coronary artery disease who is followed by Dr. Errol Hernandez for many years.  He is been feeling well.  He has not had any chest discomfort, unusual shortness of breath, orthopnea, paroxysmal nocturnal dyspnea, peripheral edema, syncope but does get a little lightheaded sometimes when he changes positions quickly.  His LDL cholesterol was recently measured at 73.  He does take medications for blood pressure her blood pressures have been well controlled.    He grew up in [place].  He is retired dental technician.  He is  and his spouse is with him at  the time of our evaluation.            Physical Examination Review of Systems   /60 (BP Location: Left arm, Patient Position: Sitting, Cuff Size: Adult Regular)   Pulse 76   Resp 16   Ht 1.829 m (6')   Wt 86.4 kg (190 lb 6.4 oz)   BMI 25.82 kg/m    Body mass index is 25.82 kg/m .  Wt Readings from Last 3 Encounters:   07/18/23 86.4 kg (190 lb 6.4 oz)   11/05/20 86 kg (189 lb 8 oz)   10/28/19 86.9 kg (191 lb 9 oz)     General Appearance:   Alert, cooperative and in no acute distress.   ENT/Mouth: Pink/moist oral mucosa   EYES:  no scleral icterus, normal conjunctivae   Neck: JVP normal. No Hepatojugular reflux. Thyroid not visualized.   Chest/Lungs:   Lungs are clear to auscultation, equal chest wall expansion.   Cardiovascular:   S1, S2 without murmur ,clicks or rubs. Brachial, radial and posterior tibial pulses are intact and symetric. No carotid bruits noted   Abdomen:  Nontender. BS+.    Extremities: No cyanosis, clubbing or edema   Skin: no xanthelasma, warm.    Neurologic: normal arm movement bilateral, no tremors     Psychiatric: Appropriate affect.      Enc Vitals  BP: 112/60  Pulse: 76  Resp: 16  Weight: 86.4 kg (190 lb 6.4 oz)  Height: 182.9 cm (6')                                           Medical History  Surgical History Family History Social History   Past Medical History:   Diagnosis Date     Coronary artery disease     x3 stents     Hyperlipidemia      Hypertension      Myocardial infarction (H) 2009    Past Surgical History:   Procedure Laterality Date     APPENDECTOMY       CARDIAC CATHETERIZATION       CORONARY STENT PLACEMENT      3 stents     HC REMOVAL OF TONSILS,<11 Y/O      Description: Tonsillectomy;  Recorded: 01/03/2008;  Annotations: 1960     IMPLANT PROSTHESIS PENIS INFLATABLE       IR MISCELLANEOUS PROCEDURE  5/5/2008     IR MISCELLANEOUS PROCEDURE  11/20/2008     JOINT REPLACEMENT Right     Right great toe     OTHER SURGICAL HISTORY      prostratectomy     PENILE PROSTHESIS  REVISION N/A 6/30/2015    Procedure: REMOVE PENILE PROSTHESIS;  Surgeon: John [place] MD;  Location: [facility] Main OR;  Service:     No family history on file. Social History     Socioeconomic History     Marital status:      Spouse name: Not on file     Number of children: Not on file     Years of education: Not on file     Highest education level: Not on file   Occupational History     Not on file   Tobacco Use     Smoking status: Former     Types: Pipe     Smokeless tobacco: Never     Tobacco comments:     smoked pipe but hasn't for 15 years   Vaping Use     Vaping Use: Never used   Substance and Sexual Activity     Alcohol use: Yes     Drug use: Never     Sexual activity: Not on file   Other Topics Concern     Not on file   Social History Narrative     Not on file     Social Determinants of Health     Financial Resource Strain: Not on file   Food Insecurity: Not on file   Transportation Needs: Not on file   Physical Activity: Not on file   Stress: Not on file   Social Connections: Not on file   Intimate Partner Violence: Not on file   Housing Stability: Not on file          Medications  Allergies   Current Outpatient Medications   Medication Sig Dispense Refill     allopurinol (ZYLOPRIM) 300 MG tablet Take 300 mg by mouth daily       aspirin 81 MG EC tablet Take 81 mg by mouth daily       atorvastatin (LIPITOR) 80 MG tablet Take 1 tablet (80 mg) by mouth At Bedtime 90 tablet 3     inFLIXimab (REMICADE) 100 MG injection Administer REMICADE 5mg/kg IV every 6 weeks for 12 infusions       lisinopril-hydrochlorothiazide (PRINZIDE,ZESTORETIC) 20-25 mg per tablet [LISINOPRIL-HYDROCHLOROTHIAZIDE (PRINZIDE,ZESTORETIC) 20-25 MG PER TABLET] Take 1 tablet by mouth daily. 90 tablet 0     metoprolol succinate (TOPROL-XL) 25 MG [METOPROLOL SUCCINATE (TOPROL-XL) 25 MG] Take 12.5 mg by mouth daily. 1/2 tablet daily       nitroglycerin (NITROSTAT) 0.4 MG SL tablet [NITROGLYCERIN (NITROSTAT) 0.4 MG SL TABLET] Place  0.4 mg under the tongue every 5 (five) minutes as needed for chest pain.       omeprazole (PRILOSEC) 20 MG DR capsule Take 20 mg by mouth daily       PARoxetine (PAXIL) 10 MG tablet [PAROXETINE (PAXIL) 10 MG TABLET] Take 10 mg by mouth every morning.      Allergies   Allergen Reactions     Duloxetine Nausea and Vomiting and Other (See Comments)     Omniscan [Gadodiamide] Hives     Tessalon [Benzonatate] Dizziness     Iodinated Contrast Media [Iodinated Contrast Media] Rash     GIVEN OMNIPAQUE 350 NO REACTION POST INJECTION. , Other reaction(s): Cutaneous eruption (morphologic abnormality)         Lab Results    Chemistry/lipid CBC Cardiac Enzymes/BNP/TSH/INR   Lab Results   Component Value Date    CHOL 146 11/05/2020    HDL 41 11/05/2020    TRIG 249 (H) 11/07/2012    No results found for: WBC, HGB, HCT, MCV, PLT No results found for: CKTOTAL, CKMB, TROPONINI, BNP, TSH, INR

## 2023-07-25 ENCOUNTER — TRANSFERRED RECORDS (OUTPATIENT)
Dept: HEALTH INFORMATION MANAGEMENT | Facility: CLINIC | Age: 73
End: 2023-07-25

## 2023-07-31 ENCOUNTER — TRANSFERRED RECORDS (OUTPATIENT)
Dept: HEALTH INFORMATION MANAGEMENT | Facility: CLINIC | Age: 73
End: 2023-07-31

## 2023-08-05 ENCOUNTER — TRANSFERRED RECORDS (OUTPATIENT)
Dept: HEALTH INFORMATION MANAGEMENT | Facility: CLINIC | Age: 73
End: 2023-08-05

## 2023-08-29 ENCOUNTER — TRANSFERRED RECORDS (OUTPATIENT)
Dept: HEALTH INFORMATION MANAGEMENT | Facility: CLINIC | Age: 73
End: 2023-08-29

## 2023-08-29 ENCOUNTER — MEDICAL CORRESPONDENCE (OUTPATIENT)
Dept: HEALTH INFORMATION MANAGEMENT | Facility: CLINIC | Age: 73
End: 2023-08-29

## 2023-09-15 ENCOUNTER — TELEPHONE (OUTPATIENT)
Dept: CARDIOLOGY | Facility: CLINIC | Age: 73
End: 2023-09-15
Payer: MEDICARE

## 2023-09-15 NOTE — TELEPHONE ENCOUNTER
[facility] Call Center    Phone Message    May a detailed message be left on voicemail: yes     Reason for Call: Other: Patient states that he had worn a heart monitor. Placed at urgent care through [facility]. They just sent over the results and would like Dr. Wang to take a look at these. Patient is scheduled for a follow up and on a wait list to go over results. However the very first appt is November 2023. Patient is asking if Dr. Wang can review the results and call him to [facility] these.       Action Taken: Other: cardiology    Travel Screening: Not Applicable  Thank you!  [facility]

## 2023-09-15 NOTE — TELEPHONE ENCOUNTER
Spoke with Pt regarding call back request. Will wait records to be received. Informed provider is out of the office for the next few weeks. No further questions-YANDY

## 2023-09-20 ENCOUNTER — TELEPHONE (OUTPATIENT)
Dept: CARDIOLOGY | Facility: CLINIC | Age: 73
End: 2023-09-20
Payer: MEDICARE

## 2023-09-20 DIAGNOSIS — I47.10 SUPRAVENTRICULAR TACHYCARDIA (H): Primary | ICD-10-CM

## 2023-09-20 NOTE — TELEPHONE ENCOUNTER
[facility] Call Center    Phone Message    May a detailed message be left on voicemail: yes     Reason for Call: Other: Patient's wife Ida called wanting to check if heart monitor results that patient had placed at Urgent Care through [facility] has been received yet. Results were sent to have Dr. Wang review them. Please call Ida back to further discuss.     Action Taken: Other: Cardiology    Travel Screening: Not Applicable      Thank you!  [facility]

## 2023-09-20 NOTE — TELEPHONE ENCOUNTER
Spoke with Pt and spouse as recvd ziopatch monitor results. Predominant underlying rhythm is NSR but there are episodes of SVT. Pt indicated currently asymptomatic. Had discussed results with PCP and referred to EP discussed scheduling with EP  at this time. Pt in agreement. Transferred to scheduling. -YANDY

## 2023-10-02 ENCOUNTER — TELEPHONE (OUTPATIENT)
Dept: CARDIOLOGY | Facility: CLINIC | Age: 73
End: 2023-10-02

## 2023-10-02 ENCOUNTER — OFFICE VISIT (OUTPATIENT)
Dept: CARDIOLOGY | Facility: CLINIC | Age: 73
End: 2023-10-02
Payer: MEDICARE

## 2023-10-02 VITALS
OXYGEN SATURATION: 98 % | SYSTOLIC BLOOD PRESSURE: 118 MMHG | HEART RATE: 62 BPM | WEIGHT: 185 LBS | BODY MASS INDEX: 25.9 KG/M2 | DIASTOLIC BLOOD PRESSURE: 62 MMHG | RESPIRATION RATE: 16 BRPM | HEIGHT: 71 IN

## 2023-10-02 DIAGNOSIS — R00.2 PALPITATIONS: Primary | ICD-10-CM

## 2023-10-02 PROCEDURE — 99204 OFFICE O/P NEW MOD 45 MIN: CPT | Performed by: INTERNAL MEDICINE

## 2023-10-02 RX ORDER — ALENDRONATE SODIUM 70 MG/1
70 TABLET ORAL
COMMUNITY
Start: 2023-08-30

## 2023-10-02 RX ORDER — ATORVASTATIN CALCIUM 40 MG/1
40 TABLET, FILM COATED ORAL AT BEDTIME
COMMUNITY
Start: 2023-09-28

## 2023-10-02 RX ORDER — METOPROLOL SUCCINATE 25 MG/1
12.5 TABLET, EXTENDED RELEASE ORAL DAILY
Qty: 30 TABLET | Refills: 3 | Status: SHIPPED | OUTPATIENT
Start: 2023-10-02 | End: 2023-10-02

## 2023-10-02 RX ORDER — METOPROLOL SUCCINATE 25 MG/1
12.5 TABLET, EXTENDED RELEASE ORAL DAILY
Qty: 45 TABLET | Refills: 3 | Status: SHIPPED | OUTPATIENT
Start: 2023-10-02

## 2023-10-02 NOTE — LETTER
"10/2/2023    LOU GUPTA MD  [facility] [address] [place] [id number]    RE: Bill [place]ries       Dear Colleague,     I had the pleasure of seeing [place] in the [facility].    HEART CARE ENCOUNTER CONSULTATON NOTE      [facility]  [phone]      Assessment/Recommendations   Assessment/Plan:    Bill [place] is a very pleasant 73 year old male with CAD s/p PCI, HTN HLPD who presents to the EP clinic today.    Palpitations  - Short runs of SVT likely AT noted on the Zio patch  - Will start Metoprolol succinate 12.5 mg   - may consider an ILR if symptoms get more prolonged    2. CAD  - stable  - continue same meds    3. HTN  - Slightly hypotensive on some occasions  - recommended improving hydration    4. Hyperlipidemia  - continue statin    Time spent: 45 minutes spent on the date of the encounter doing chart review, history and exam, documentation and further activities as noted above.         History of Present Illness/Subjective    HPI: [place] is a very pleasant 73 year old male with CAD s/p PCI, HTN HLPD who presents to the EP clinic today.    Bill had palpitations which prompted an urgent care visit and a Zio patch was ordered. He has had these symptoms for several years ago. He did have symptoms during the Zio patch monitoring. Short runs of SVT likely AT, longest being 10 beats were noted otherwise unremarkable.    Recent Zio patch(personally reviewed):    NSR with HR from 46 to 150. Short runs of SVT likely AT, longest being 10 beats. Minimal atrial and ventricular ectopics noted.      Labs below reviewed personally     Physical Examination  Review of Systems   Vitals: /62 (BP Location: Right arm, Patient Position: Sitting, Cuff Size: Adult Large)   Pulse 62   Resp 16   Ht 1.803 m (5' 11\")   Wt 83.9 kg (185 lb)   SpO2 98%   BMI 25.80 kg/m    BMI= Body mass index is 25.8 kg/m .  Wt Readings from Last 3 Encounters: "   10/02/23 83.9 kg (185 lb)   07/18/23 86.4 kg (190 lb 6.4 oz)   11/05/20 86 kg (189 lb 8 oz)       General Appearance:   no distress, normal body habitus   ENT/Mouth: membranes moist, no oral lesions or bleeding gums.      EYES:  no scleral icterus, normal conjunctivae   Neck: no carotid bruits or thyromegaly   Chest/Lungs:   lungs are clear to auscultation, no rales or wheezing,  sternal scar, equal chest wall expansion    Cardiovascular:   Regular. Normal first and second heart sounds with no murmurs, rubs, or gallops; the carotid, radial and posterior tibial pulses are intact, no edema bilaterally    Abdomen:  no organomegaly, masses, bruits, or tenderness; bowel sounds are present   Extremities: no cyanosis or clubbing   Skin: no xanthelasma, warm.    Neurologic: normal  bilateral, no tremors     Psychiatric: alert and oriented x3, calm        Please refer above for cardiac ROS details.        Medical History  Surgical History Family History Social History   Past Medical History:   Diagnosis Date    Coronary artery disease     x3 stents    Hyperlipidemia     Hypertension     Myocardial infarction (H) 2009     Past Surgical History:   Procedure Laterality Date    APPENDECTOMY      CARDIAC CATHETERIZATION      CORONARY STENT PLACEMENT      3 stents    HC REMOVAL OF TONSILS,<11 Y/O      Description: Tonsillectomy;  Recorded: 01/03/2008;  Annotations: 1960    IMPLANT PROSTHESIS PENIS INFLATABLE      IR MISCELLANEOUS PROCEDURE  5/5/2008    IR MISCELLANEOUS PROCEDURE  11/20/2008    JOINT REPLACEMENT Right     Right great toe    OTHER SURGICAL HISTORY      prostratectomy    PENILE PROSTHESIS REVISION N/A 6/30/2015    Procedure: REMOVE PENILE PROSTHESIS;  Surgeon: John Snyder MD;  Location: [facility];  Service:      No family history on file.     Social History     Socioeconomic History    Marital status:      Spouse name: Not on file    Number of children: Not on file    Years of education:  Not on file    Highest education level: Not on file   Occupational History    Not on file   Tobacco Use    Smoking status: Former     Types: Pipe    Smokeless tobacco: Never    Tobacco comments:     smoked pipe but hasn't for 15 years   Vaping Use    Vaping Use: Never used   Substance and Sexual Activity    Alcohol use: Yes    Drug use: Never    Sexual activity: Not on file   Other Topics Concern    Not on file   Social History Narrative    Not on file     Social Determinants of Health     Financial Resource Strain: Not on file   Food Insecurity: Not on file   Transportation Needs: Not on file   Physical Activity: Not on file   Stress: Not on file   Social Connections: Not on file   Interpersonal Safety: Not on file   Housing Stability: Not on file           Medications  Allergies   Current Outpatient Medications   Medication Sig Dispense Refill    alendronate (FOSAMAX) 70 MG tablet Take 70 mg by mouth every 7 days      aspirin 81 MG EC tablet Take 81 mg by mouth daily      atorvastatin (LIPITOR) 40 MG tablet Take 40 mg by mouth At Bedtime      inFLIXimab (REMICADE) 100 MG injection Administer REMICADE 5mg/kg IV every 6 weeks for 12 infusions      lisinopril-hydrochlorothiazide (PRINZIDE,ZESTORETIC) 20-25 mg per tablet [LISINOPRIL-HYDROCHLOROTHIAZIDE (PRINZIDE,ZESTORETIC) 20-25 MG PER TABLET] Take 1 tablet by mouth daily. 90 tablet 0    metoprolol succinate ER (TOPROL XL) 25 MG 24 hr tablet Take 0.5 tablets (12.5 mg) by mouth daily 30 tablet 3    omeprazole (PRILOSEC) 20 MG DR capsule Take 20 mg by mouth daily      PARoxetine (PAXIL) 10 MG tablet [PAROXETINE (PAXIL) 10 MG TABLET] Take 10 mg by mouth every morning.      allopurinol (ZYLOPRIM) 300 MG tablet Take 300 mg by mouth daily (Patient not taking: Reported on 10/2/2023)      nitroglycerin (NITROSTAT) 0.4 MG SL tablet [NITROGLYCERIN (NITROSTAT) 0.4 MG SL TABLET] Place 0.4 mg under the tongue every 5 (five) minutes as needed for chest pain. (Patient not taking:  Reported on 10/2/2023)         Allergies   Allergen Reactions    Duloxetine Nausea and Vomiting and Other (See Comments)    Omniscan [Gadodiamide] Hives    Tessalon [Benzonatate] Dizziness    Iodinated Contrast Media [Iodinated Contrast Media] Rash     GIVEN OMNIPAQUE 350 NO REACTION POST INJECTION. , Other reaction(s): Cutaneous eruption (morphologic abnormality)          Lab Results    Chemistry/lipid CBC Cardiac Enzymes/BNP/TSH/INR   Recent Labs   Lab Test 07/01/23  1009 06/20/22  1556 11/05/20  1344   CHOL  --   --  146   HDL  --   --  41   TRIG 169   < >  --     < > = values in this interval not displayed.     No results for input(s): LDL in the last 73877 hours.  No results for input(s): NA, POTASSIUM, CHLORIDE, CO2, GLC, BUN, CR, GFRESTIMATED, [place] in the last 99783 hours.    Invalid input(s): GRFESTBLACK  No results for input(s): CR in the last 25134 hours.  No results for input(s): A1C in the last 40542 hours.       No results for input(s): WBC, HGB, HCT, MCV, PLT in the last 20142 hours.  No results for input(s): HGB in the last 52962 hours. No results for input(s): TROPONINI in the last 17201 hours.  No results for input(s): BNP, NTBNPI, NTBNP in the last 40166 hours.  No results for input(s): TSH in the last 27416 hours.  No results for input(s): INR in the last 23565 hours.     Ilana Francis MD      Thank you for allowing me to participate in the care of your patient.      Sincerely,     Ilana Francis MD     [facility]  cc: No referring provider defined for this encounter.

## 2023-10-02 NOTE — PATIENT INSTRUCTIONS
[facility]  Cardiac Electrophysiology  [address]  [place] [address]   Office: [phone]  Fax: [phone]       Thank you for seeing us in clinic today - it is a pleasure to be a part of your care team.  Below is a summary of our plan from today's visit.      Will start Metoprolol  Let us know how you feel on the medication  Follow up in 6 months    Please do not hesitate to be in touch with our office at [phone] with any questions that may arise.      Thank you for trusting us with your care,    Ilana Francis MD  Clinical Cardiac Electrophysiology  [facility]  [address]  [place] [address]   Office: [phone]  Fax: [phone]

## 2023-10-02 NOTE — PROGRESS NOTES
"  HEART CARE ENCOUNTER CONSULTATON NOTE      [facility]  [phone]      Assessment/Recommendations   Assessment/Plan:    Bill Humphries is a very pleasant 73 year old male with CAD s/p PCI, HTN HLPD who presents to the EP clinic today.    Palpitations  - Short runs of SVT likely AT noted on the Zio patch  - Will start Metoprolol succinate 12.5 mg   - may consider an ILR if symptoms get more prolonged    2. CAD  - stable  - continue same meds    3. HTN  - Slightly hypotensive on some occasions  - recommended improving hydration    4. Hyperlipidemia  - continue statin    Time spent: 45 minutes spent on the date of the encounter doing chart review, history and exam, documentation and further activities as noted above.         History of Present Illness/Subjective    HPI: Bill Humphries is a very pleasant 73 year old male with CAD s/p PCI, HTN HLPD who presents to the EP clinic today.    Bill had palpitations which prompted an urgent care visit and a Zio patch was ordered. He has had these symptoms for several years ago. He did have symptoms during the Zio patch monitoring. Short runs of SVT likely AT, longest being 10 beats were noted otherwise unremarkable.    Recent Zio patch(personally reviewed):    NSR with HR from 46 to 150. Short runs of SVT likely AT, longest being 10 beats. Minimal atrial and ventricular ectopics noted.      Labs below reviewed personally     Physical Examination  Review of Systems   Vitals: /62 (BP Location: Right arm, Patient Position: Sitting, Cuff Size: Adult Large)   Pulse 62   Resp 16   Ht 1.803 m (5' 11\")   Wt 83.9 kg (185 lb)   SpO2 98%   BMI 25.80 kg/m    BMI= Body mass index is 25.8 kg/m .  Wt Readings from Last 3 Encounters:   10/02/23 83.9 kg (185 lb)   07/18/23 86.4 kg (190 lb 6.4 oz)   11/05/20 86 kg (189 lb 8 oz)       General Appearance:   no distress, normal body habitus   ENT/Mouth: membranes moist, no oral lesions or bleeding gums.    "   EYES:  no scleral icterus, normal conjunctivae   Neck: no carotid bruits or thyromegaly   Chest/Lungs:   lungs are clear to auscultation, no rales or wheezing,  sternal scar, equal chest wall expansion    Cardiovascular:   Regular. Normal first and second heart sounds with no murmurs, rubs, or gallops; the carotid, radial and posterior tibial pulses are intact, no edema bilaterally    Abdomen:  no organomegaly, masses, bruits, or tenderness; bowel sounds are present   Extremities: no cyanosis or clubbing   Skin: no xanthelasma, warm.    Neurologic: normal  bilateral, no tremors     Psychiatric: alert and oriented x3, calm        Please refer above for cardiac ROS details.        Medical History  Surgical History Family History Social History   Past Medical History:   Diagnosis Date    Coronary artery disease     x3 stents    Hyperlipidemia     Hypertension     Myocardial infarction (H) 2009     Past Surgical History:   Procedure Laterality Date    APPENDECTOMY      CARDIAC CATHETERIZATION      CORONARY STENT PLACEMENT      3 stents    HC REMOVAL OF TONSILS,<9 Y/O      Description: Tonsillectomy;  Recorded: 01/03/2008;  Annotations: 1960    IMPLANT PROSTHESIS PENIS INFLATABLE      IR MISCELLANEOUS PROCEDURE  5/5/2008    IR MISCELLANEOUS PROCEDURE  11/20/2008    JOINT REPLACEMENT Right     Right great toe    OTHER SURGICAL HISTORY      prostratectomy    PENILE PROSTHESIS REVISION N/A 6/30/2015    Procedure: REMOVE PENILE PROSTHESIS;  Surgeon: John Snyder MD;  Location: [facility];  Service:      No family history on file.     Social History     Socioeconomic History    Marital status:      Spouse name: Not on file    Number of children: Not on file    Years of education: Not on file    Highest education level: Not on file   Occupational History    Not on file   Tobacco Use    Smoking status: Former     Types: Pipe    Smokeless tobacco: Never    Tobacco comments:     smoked pipe but hasn't  for 15 years   Vaping Use    Vaping Use: Never used   Substance and Sexual Activity    Alcohol use: Yes    Drug use: Never    Sexual activity: Not on file   Other Topics Concern    Not on file   Social History Narrative    Not on file     Social Determinants of Health     Financial Resource Strain: Not on file   Food Insecurity: Not on file   Transportation Needs: Not on file   Physical Activity: Not on file   Stress: Not on file   Social Connections: Not on file   Interpersonal Safety: Not on file   Housing Stability: Not on file           Medications  Allergies   Current Outpatient Medications   Medication Sig Dispense Refill    alendronate (FOSAMAX) 70 MG tablet Take 70 mg by mouth every 7 days      aspirin 81 MG EC tablet Take 81 mg by mouth daily      atorvastatin (LIPITOR) 40 MG tablet Take 40 mg by mouth At Bedtime      inFLIXimab (REMICADE) 100 MG injection Administer REMICADE 5mg/kg IV every 6 weeks for 12 infusions      lisinopril-hydrochlorothiazide (PRINZIDE,ZESTORETIC) 20-25 mg per tablet [LISINOPRIL-HYDROCHLOROTHIAZIDE (PRINZIDE,ZESTORETIC) 20-25 MG PER TABLET] Take 1 tablet by mouth daily. 90 tablet 0    metoprolol succinate ER (TOPROL XL) 25 MG 24 hr tablet Take 0.5 tablets (12.5 mg) by mouth daily 30 tablet 3    omeprazole (PRILOSEC) 20 MG DR capsule Take 20 mg by mouth daily      PARoxetine (PAXIL) 10 MG tablet [PAROXETINE (PAXIL) 10 MG TABLET] Take 10 mg by mouth every morning.      allopurinol (ZYLOPRIM) 300 MG tablet Take 300 mg by mouth daily (Patient not taking: Reported on 10/2/2023)      nitroglycerin (NITROSTAT) 0.4 MG SL tablet [NITROGLYCERIN (NITROSTAT) 0.4 MG SL TABLET] Place 0.4 mg under the tongue every 5 (five) minutes as needed for chest pain. (Patient not taking: Reported on 10/2/2023)         Allergies   Allergen Reactions    Duloxetine Nausea and Vomiting and Other (See Comments)    Omniscan [Gadodiamide] Hives    Tessalon [Benzonatate] Dizziness    Iodinated Contrast Media  [Iodinated Contrast Media] Rash     GIVEN OMNIPAQUE 350 NO REACTION POST INJECTION. , Other reaction(s): Cutaneous eruption (morphologic abnormality)          Lab Results    Chemistry/lipid CBC Cardiac Enzymes/BNP/TSH/INR   Recent Labs   Lab Test 07/01/23  1009 06/20/22  1556 11/05/20  1344   CHOL  --   --  146   HDL  --   --  41   TRIG 169   < >  --     < > = values in this interval not displayed.     No results for input(s): LDL in the last 44369 hours.  No results for input(s): NA, POTASSIUM, CHLORIDE, CO2, GLC, BUN, CR, GFRESTIMATED, [place] in the last 95881 hours.    Invalid input(s): GRFESTBLACK  No results for input(s): CR in the last 82340 hours.  No results for input(s): A1C in the last 31116 hours.       No results for input(s): WBC, HGB, HCT, MCV, PLT in the last 49614 hours.  No results for input(s): HGB in the last 04286 hours. No results for input(s): TROPONINI in the last 97214 hours.  No results for input(s): BNP, NTBNPI, NTBNP in the last 93846 hours.  No results for input(s): TSH in the last 52653 hours.  No results for input(s): INR in the last 06381 hours.     Ilana Francis MD